# Patient Record
Sex: FEMALE | Race: WHITE | NOT HISPANIC OR LATINO | Employment: FULL TIME
[De-identification: names, ages, dates, MRNs, and addresses within clinical notes are randomized per-mention and may not be internally consistent; named-entity substitution may affect disease eponyms.]

---

## 2020-01-17 ENCOUNTER — RECORDS - HEALTHEAST (OUTPATIENT)
Dept: ADMINISTRATIVE | Facility: OTHER | Age: 60
End: 2020-01-17

## 2020-01-17 LAB — ALBUMIN (URINE) MG/SPEC: 0.8 MG/DL

## 2020-01-22 ENCOUNTER — RECORDS - HEALTHEAST (OUTPATIENT)
Dept: ADMINISTRATIVE | Facility: OTHER | Age: 60
End: 2020-01-22

## 2020-01-30 ENCOUNTER — RECORDS - HEALTHEAST (OUTPATIENT)
Dept: ADMINISTRATIVE | Facility: OTHER | Age: 60
End: 2020-01-30

## 2020-03-04 ENCOUNTER — OFFICE VISIT - HEALTHEAST (OUTPATIENT)
Dept: FAMILY MEDICINE | Facility: CLINIC | Age: 60
End: 2020-03-04

## 2020-03-04 ENCOUNTER — RECORDS - HEALTHEAST (OUTPATIENT)
Dept: ADMINISTRATIVE | Facility: OTHER | Age: 60
End: 2020-03-04

## 2020-03-04 DIAGNOSIS — Z12.31 VISIT FOR SCREENING MAMMOGRAM: ICD-10-CM

## 2020-03-04 DIAGNOSIS — Z23 IMMUNIZATION DUE: ICD-10-CM

## 2020-03-04 DIAGNOSIS — M25.562 CHRONIC PAIN OF LEFT KNEE: ICD-10-CM

## 2020-03-04 DIAGNOSIS — Z76.89 ESTABLISHING CARE WITH NEW DOCTOR, ENCOUNTER FOR: ICD-10-CM

## 2020-03-04 DIAGNOSIS — Z12.11 SCREEN FOR COLON CANCER: ICD-10-CM

## 2020-03-04 DIAGNOSIS — M23.52 CHRONIC INSTABILITY OF KNEE, LEFT KNEE: ICD-10-CM

## 2020-03-04 DIAGNOSIS — G89.29 CHRONIC PAIN OF LEFT KNEE: ICD-10-CM

## 2020-03-04 ASSESSMENT — MIFFLIN-ST. JEOR: SCORE: 1470.94

## 2020-03-09 ENCOUNTER — RECORDS - HEALTHEAST (OUTPATIENT)
Dept: HEALTH INFORMATION MANAGEMENT | Facility: CLINIC | Age: 60
End: 2020-03-09

## 2020-03-17 ENCOUNTER — COMMUNICATION - HEALTHEAST (OUTPATIENT)
Dept: FAMILY MEDICINE | Facility: CLINIC | Age: 60
End: 2020-03-17

## 2020-03-23 ENCOUNTER — COMMUNICATION - HEALTHEAST (OUTPATIENT)
Dept: FAMILY MEDICINE | Facility: CLINIC | Age: 60
End: 2020-03-23

## 2020-06-09 ENCOUNTER — HOSPITAL ENCOUNTER (OUTPATIENT)
Dept: MRI IMAGING | Facility: HOSPITAL | Age: 60
Discharge: HOME | End: 2020-06-09
Attending: FAMILY MEDICINE

## 2020-06-09 DIAGNOSIS — G89.29 CHRONIC PAIN OF LEFT KNEE: ICD-10-CM

## 2020-06-09 DIAGNOSIS — M25.562 CHRONIC PAIN OF LEFT KNEE: ICD-10-CM

## 2020-06-09 DIAGNOSIS — M23.52 CHRONIC INSTABILITY OF KNEE, LEFT KNEE: ICD-10-CM

## 2020-06-16 ENCOUNTER — OFFICE VISIT - HEALTHEAST (OUTPATIENT)
Dept: FAMILY MEDICINE | Facility: CLINIC | Age: 60
End: 2020-06-16

## 2020-06-16 DIAGNOSIS — L29.9 ITCHING: ICD-10-CM

## 2020-06-16 DIAGNOSIS — M25.562 CHRONIC PAIN OF LEFT KNEE: ICD-10-CM

## 2020-06-16 DIAGNOSIS — R20.0 NUMBNESS OF RIGHT ANTERIOR THIGH: ICD-10-CM

## 2020-06-16 DIAGNOSIS — G89.29 CHRONIC PAIN OF LEFT KNEE: ICD-10-CM

## 2020-06-19 ENCOUNTER — OFFICE VISIT - HEALTHEAST (OUTPATIENT)
Dept: FAMILY MEDICINE | Facility: CLINIC | Age: 60
End: 2020-06-19

## 2020-06-19 DIAGNOSIS — M79.651 PAIN OF RIGHT THIGH: ICD-10-CM

## 2020-06-19 DIAGNOSIS — G45.4 TGA (TRANSIENT GLOBAL AMNESIA): ICD-10-CM

## 2020-06-19 DIAGNOSIS — M33.13 DERMATOMYOSITIS (H): ICD-10-CM

## 2020-06-24 ENCOUNTER — OFFICE VISIT (OUTPATIENT)
Dept: NEUROLOGY | Facility: CLINIC | Age: 60
End: 2020-06-24
Payer: COMMERCIAL

## 2020-06-24 VITALS — WEIGHT: 192 LBS | HEIGHT: 67 IN | BODY MASS INDEX: 30.13 KG/M2

## 2020-06-24 DIAGNOSIS — R20.0 NUMBNESS: Primary | ICD-10-CM

## 2020-06-24 PROCEDURE — 99207 ZZC NO CHARGE LOS: CPT | Mod: 25 | Performed by: PSYCHIATRY & NEUROLOGY

## 2020-06-24 RX ORDER — LISINOPRIL/HYDROCHLOROTHIAZIDE 10-12.5 MG
TABLET ORAL EVERY 24 HOURS
COMMUNITY

## 2020-06-24 RX ORDER — HYDROXYCHLOROQUINE SULFATE 200 MG/1
TABLET, FILM COATED ORAL
COMMUNITY
Start: 2020-06-09

## 2020-06-24 RX ORDER — ASPIRIN 325 MG
325 TABLET ORAL
COMMUNITY

## 2020-06-24 RX ORDER — OMEGA-3 FATTY ACIDS/FISH OIL 300-1000MG
2 CAPSULE ORAL
COMMUNITY

## 2020-06-24 ASSESSMENT — MIFFLIN-ST. JEOR: SCORE: 1488.54

## 2020-06-24 NOTE — PROGRESS NOTES
"NEUROLOGY OUTPATIENT CONSULT NOTE (VIDEO)  Jun 24, 2020     CHIEF COMPLAINT/REASON FOR VISIT/REASON FOR CONSULT  Patient presents with:  Transient global amnesia    REASON FOR CONSULTATION- TGA    REFERRAL SOURCE  Dr. Hawa Stock  CC Dr. Hawa Stock    Video Visit Consent  Patient is being evaluated via a billable video visit. The patient has been notified of following:   \"This video visit will be conducted via a call between you and your physician/provider. We have found that certain health care needs can be provided without the need for an in-person physical exam. This service lets us provide the care you need with a video conversation. If a prescription is necessary we can send it directly to your pharmacy. If lab work is needed we can place an order for that and you can then stop by our lab to have the test done at a later time.  If during the course of the call the physician/provider feels a video visit is not appropriate, you will not be charged for this service.  Physician has received verbal consent for a Video Visit from the patient? YES  Patient would like the video invitation sent by: Email/SMS    Video Visit Details  Type of service: Video Visit  Video Start Time: 12:42  Video End Time (time video stopped): ***  Originating Location (pt. Location): Patient's Home  Distant Location (provider location): [facility] [place]   Mode of Communication: Video Conference via [employer]        HISTORY OF PRESENT ILLNESS  Kaylee Pabon is a 59 year old female who comes to the Neurology clinic for evaluation of  ***    Previous history is reviewed and this is unchanged.    PAST MEDICAL/SURGICAL HISTORY  Past Medical History:   Diagnosis Date     Dementia (H)      Hypertension      There is no problem list on file for this patient.      FAMILY HISTORY  Family History   Problem Relation Age of Onset     Diabetes Father      Chiari Malformation Paternal Grandmother      Cancer Paternal Uncle  " "      SOCIAL HISTORY  Social History     Tobacco Use     Smoking status: Never Smoker     Smokeless tobacco: Never Used   Substance Use Topics     Alcohol use: Not Currently     Drug use: Never       SYSTEMS REVIEW  Twelve-system ROS was done and other than the HPI this was negative.    MEDICATIONS  diclofenac (VOLTAREN) 1 % topical gel,   hydroxychloroquine (PLAQUENIL) 200 MG tablet,   aspirin (ASA) 325 MG tablet, Take 325 mg by mouth  lisinopril-hydrochlorothiazide (ZESTORETIC) 10-12.5 MG tablet, every 24 hours  omega 3 1000 MG CAPS, Take 2 g by mouth    No current facility-administered medications on file prior to visit.        PHYSICAL EXAMINATION  VITALS: Ht 1.702 m (5' 7\")   Wt 87.1 kg (192 lb)   BMI 30.07 kg/m    PHYSICAL EXAM  Exam was limited due to video encounter.    Vitals-Unable to do on video  GENERAL -Health appearing, No apparent distress  EYES- No scleral icterus, no eyelid droop, Pupils symmetric  HEENT - Normocephalic, atraumatic, Hearing grossly intact; Oral mucosa moist and pink in color. External Ears and nose intact.   Neck - soft/flexible with normal ROM on visual inspection.  PULM - Good spontaneous respiratory effort;  CV- No edema on visual inspection  MSK- Gait - see Neuro section; Strength and tone- see Neuro section; Range of motion grossly intact.  Psych- Normal mood and affect. Good judgment and insight.     Neurological  Mental status - Patient is awake and oriented. Attention span is normal. Language is fluent and follows commands appropriately.   Cranial nerves - Pupils are symmetric; EOMI, NLF symmetric  Motor - There is no pronator drift. Antigravity in all 4 ext.  Tone - No evidence of rigidity on visual inspection. No tremor.  Reflexes - Unable to do on video  Sensation - Unable to do on Video  Coordination - Finger to nose without dysmetria.   Gait and station - Romberg is negative. Gait is steady        DIAGNOSTICS  MRI    EMG    EEG    CARDIOLOGY    RELEVANT " LABS    OUTSIDE RECORDS  Outside referral notes were reviewed and pertinent information has been summarized in the HPI.    IMPRESSION/REPORT/PLAN  There are no diagnoses linked to this encounter.    There are no discontinued medications.    This is a 59 year old female        Sesar Anthony MD  Neurologist  [facility]  Tel:- [phone]    This note was dictated using voice recognition software.  Any grammatical or context distortions are unintentional and inherent to the software.

## 2020-06-24 NOTE — Clinical Note
6/24/2020         RE: Kaylee Pabon  [address] [address]        Dear Colleague,    Thank you for referring your patient, Kaylee Pabon, to the [facility] NEUROLOGY [place]. Please see a copy of my visit note below.    No notes on file    Again, thank you for allowing me to participate in the care of your patient.        Sincerely,        Sesar Anthony MD

## 2020-06-24 NOTE — NURSING NOTE
Chief Complaint   Patient presents with     Transient global amnesia     Esperanza Lul on 6/24/2020 at 11:23 AM

## 2020-06-29 ENCOUNTER — TRANSFERRED RECORDS (OUTPATIENT)
Dept: HEALTH INFORMATION MANAGEMENT | Facility: CLINIC | Age: 60
End: 2020-06-29

## 2020-06-29 ENCOUNTER — RECORDS - HEALTHEAST (OUTPATIENT)
Dept: ADMINISTRATIVE | Facility: OTHER | Age: 60
End: 2020-06-29

## 2020-06-29 ENCOUNTER — OFFICE VISIT (OUTPATIENT)
Dept: NEUROLOGY | Facility: CLINIC | Age: 60
End: 2020-06-29
Payer: COMMERCIAL

## 2020-06-29 VITALS
HEART RATE: 86 BPM | WEIGHT: 192 LBS | HEIGHT: 67 IN | SYSTOLIC BLOOD PRESSURE: 131 MMHG | DIASTOLIC BLOOD PRESSURE: 81 MMHG | BODY MASS INDEX: 30.13 KG/M2

## 2020-06-29 DIAGNOSIS — G57.11 MERALGIA PARAESTHETICA, RIGHT: Primary | ICD-10-CM

## 2020-06-29 PROCEDURE — 99204 OFFICE O/P NEW MOD 45 MIN: CPT | Mod: 95 | Performed by: PSYCHIATRY & NEUROLOGY

## 2020-06-29 ASSESSMENT — MIFFLIN-ST. JEOR: SCORE: 1488.54

## 2020-06-29 NOTE — PROGRESS NOTES
NEUROLOGY OUTPATIENT CONSULT NOTE  Jun 29, 2020     CHIEF COMPLAINT/REASON FOR VISIT/REASON FOR CONSULT  Patient presents with:  Spasms: LLE itchiness and spasms   Transient global amnesia    REASON FOR CONSULTATION-right leg numbness and itching    REFERRAL SOURCE  Dr. Hawa Stock   Dr. Hawa Stock    HISTORY OF PRESENT ILLNESS  Kaylee Pabon is a 59 year old female who comes to the Neurology clinic for evaluation of right leg numbness and itching.  Patient reports the symptoms came on about 6 weeks ago.  Denies any triggering factors.  Denies any weight gain or wearing any tight clothes or tight belts.  Symptoms are limited to the lateral side of the right thigh.  Is more numbness pain and itching.  Symptoms have improved since onset.  Nothing makes symptoms better or worse.  They are more prominent in the evening time.  She does have some back pain on the left side.  Denies any shooting pain down the legs.  Denies any balance issues or weakness in the leg.  She does have a recent diagnosis of dermatomyositis and is on hydrochloric when.  The pain sometimes wakes the patient up and she has spasms and cramps in her leg.  She has not tried any medications for this.  Denies any other associated symptoms.    Significant for transient global amnesia.  This was 4 years ago in [place].  Symptoms lasted for hours.  Patient had no recollection of this event.  Testing was all negative.  She has not had any recurrence of the symptoms or any other associated symptoms.    Previous history is reviewed and this is unchanged.    PAST MEDICAL/SURGICAL HISTORY  Significant for migraines, arthritis, high blood pressure    FAMILY HISTORY  Family History   Problem Relation Age of Onset     Diabetes Father      Chiari Malformation Paternal Grandmother      Cancer Paternal Uncle    negative for neurological conditions    SOCIAL HISTORY  Social History     Tobacco Use     Smoking status: Never Smoker     Smokeless tobacco: Never Used  "  Substance Use Topics     Alcohol use: Not Currently     Drug use: Never       SYSTEMS REVIEW  Twelve-system ROS was done and other than the HPI this was negative except for back pain, arm and leg pain, joint pain, numbness and tingling, dizziness, sleepiness during the day, sleeping problems, headaches, anxiety, palpitations, bloating, bowel problems, skin changes    MEDICATIONS  aspirin (ASA) 325 MG tablet, Take 325 mg by mouth  hydroxychloroquine (PLAQUENIL) 200 MG tablet,   lisinopril-hydrochlorothiazide (ZESTORETIC) 10-12.5 MG tablet, every 24 hours  omega 3 1000 MG CAPS, Take 2 g by mouth  diclofenac (VOLTAREN) 1 % topical gel,     No current facility-administered medications on file prior to visit.        PHYSICAL EXAMINATION  VITALS: /81 (BP Location: Left arm)   Pulse 86   Ht 1.702 m (5' 7\")   Wt 87.1 kg (192 lb)   BMI 30.07 kg/m    GENERAL: Healthy appearing, alert, no acute distress, normal habitus.  CARDIOVASCULAR: Extremities warm and well perfused. Pulses present.   EYES: Funduscopic exam does not reveal any papilledema.   NEUROLOGICAL:  Patient is awake and oriented to self, place and time.  Attention span is normal.  Memory is grossly intact.  Language is fluent and follows commands appropriately.  Appropriate fund of knowledge. Cranial nerves 2-12 are intact. There is no pronator drift.  Motor exam shows 5/5 strength in all extremities.  Tone is symmetric bilaterally in upper and lower extremities.  Reflexes are symmetric and 2+ in upper extremities and lower extremities. Sensory exam is grossly intact to light touch, pin prick and vibration with decreased pinprick in the right lower extremity in the lateral thigh region.  Finger to nose and heel to shin is without dysmetria.  Romberg is negative.  Gait is normal and the patient is able to do tandem walk and walk on toes and heels.      DIAGNOSTICS    RELEVANT LABS  DOUGLAS testing done in the past was positive for double-stranded DNA " antibodies.    OUTSIDE RECORDS  Outside referral notes were reviewed and pertinent information has been summarized in the HPI.    IMPRESSION/REPORT/PLAN  Meralgia paraesthetica, right    This is a 59 year old female with right-sided lateral thigh numbness and pain and paresthesias.  Patient symptoms are clinically suggestive of meralgia paresthetica.  Discussed prognosis of this with the patient.  We will set her up with physical therapy.  Discussed gabapentin which she wants to hold off on.  We will get an EMG check blood work to rule out other causes for her symptoms.  Overall symptoms are currently improving.  I can see her back in 6 weeks to see what the testing shows and to see if medication needs to be tried at that point.  I do not think the transient global amnesia is related to the meralgia paresthetica.  If she does have new neurological symptoms she should call the neurology clinic.    -     EMG  -     Vitamin B12  -     Vitamin B1 whole blood  -     TSH with free T4 reflex  -     Protein electrophoresis  -     Methylmalonic Acid  -     Lyme Disease Humaira with reflex to WB Serum  -     Ferritin  -     Glucose  -     CK total  -     PHYSICAL THERAPY REFERRAL    Sesar Anthony MD  Neurologist  [facility]  Tel:- [phone]    This note was dictated using voice recognition software.  Any grammatical or context distortions are unintentional and inherent to the software.

## 2020-06-29 NOTE — LETTER
6/29/2020         RE: Kaylee Pabno  [address] [address]        Dear Colleague,    Thank you for referring your patient, Kaylee Pabon, to the [facility] NEUROLOGY [place]. Please see a copy of my visit note below.    NEUROLOGY OUTPATIENT CONSULT NOTE  Jun 29, 2020     CHIEF COMPLAINT/REASON FOR VISIT/REASON FOR CONSULT  Patient presents with:  Spasms: LLE itchiness and spasms   Transient global amnesia    REASON FOR CONSULTATION-right leg numbness and itching    REFERRAL SOURCE  Dr. Hawa Stock  CC Dr. Hawa Stock    HISTORY OF PRESENT ILLNESS  Kaylee Pabon is a 59 year old female who comes to the Neurology clinic for evaluation of right leg numbness and itching.  Patient reports the symptoms came on about 6 weeks ago.  Denies any triggering factors.  Denies any weight gain or wearing any tight clothes or tight belts.  Symptoms are limited to the lateral side of the right thigh.  Is more numbness pain and itching.  Symptoms have improved since onset.  Nothing makes symptoms better or worse.  They are more prominent in the evening time.  She does have some back pain on the left side.  Denies any shooting pain down the legs.  Denies any balance issues or weakness in the leg.  She does have a recent diagnosis of dermatomyositis and is on hydrochloric when.  The pain sometimes wakes the patient up and she has spasms and cramps in her leg.  She has not tried any medications for this.  Denies any other associated symptoms.    Significant for transient global amnesia.  This was 4 years ago in [place].  Symptoms lasted for hours.  Patient had no recollection of this event.  Testing was all negative.  She has not had any recurrence of the symptoms or any other associated symptoms.    Previous history is reviewed and this is unchanged.    PAST MEDICAL/SURGICAL HISTORY  Significant for migraines, arthritis, high blood pressure    FAMILY HISTORY  Family History   Problem Relation Age  "of Onset     Diabetes Father      Chiari Malformation Paternal Grandmother      Cancer Paternal Uncle    negative for neurological conditions    SOCIAL HISTORY  Social History     Tobacco Use     Smoking status: Never Smoker     Smokeless tobacco: Never Used   Substance Use Topics     Alcohol use: Not Currently     Drug use: Never       SYSTEMS REVIEW  Twelve-system ROS was done and other than the HPI this was negative except for back pain, arm and leg pain, joint pain, numbness and tingling, dizziness, sleepiness during the day, sleeping problems, headaches, anxiety, palpitations, bloating, bowel problems, skin changes    MEDICATIONS  aspirin (ASA) 325 MG tablet, Take 325 mg by mouth  hydroxychloroquine (PLAQUENIL) 200 MG tablet,   lisinopril-hydrochlorothiazide (ZESTORETIC) 10-12.5 MG tablet, every 24 hours  omega 3 1000 MG CAPS, Take 2 g by mouth  diclofenac (VOLTAREN) 1 % topical gel,     No current facility-administered medications on file prior to visit.        PHYSICAL EXAMINATION  VITALS: /81 (BP Location: Left arm)   Pulse 86   Ht 1.702 m (5' 7\")   Wt 87.1 kg (192 lb)   BMI 30.07 kg/m    GENERAL: Healthy appearing, alert, no acute distress, normal habitus.  CARDIOVASCULAR: Extremities warm and well perfused. Pulses present.   EYES: Funduscopic exam does not reveal any papilledema.   NEUROLOGICAL:  Patient is awake and oriented to self, place and time.  Attention span is normal.  Memory is grossly intact.  Language is fluent and follows commands appropriately.  Appropriate fund of knowledge. Cranial nerves 2-12 are intact. There is no pronator drift.  Motor exam shows 5/5 strength in all extremities.  Tone is symmetric bilaterally in upper and lower extremities.  Reflexes are symmetric and 2+ in upper extremities and lower extremities. Sensory exam is grossly intact to light touch, pin prick and vibration with decreased pinprick in the right lower extremity in the lateral thigh region.  Finger to " nose and heel to shin is without dysmetria.  Romberg is negative.  Gait is normal and the patient is able to do tandem walk and walk on toes and heels.      DIAGNOSTICS    RELEVANT LABS  DOUGLAS testing done in the past was positive for double-stranded DNA antibodies.    OUTSIDE RECORDS  Outside referral notes were reviewed and pertinent information has been summarized in the HPI.    IMPRESSION/REPORT/PLAN  Meralgia paraesthetica, right    This is a 59 year old female with right-sided lateral thigh numbness and pain and paresthesias.  Patient symptoms are clinically suggestive of meralgia paresthetica.  Discussed prognosis of this with the patient.  We will set her up with physical therapy.  Discussed gabapentin which she wants to hold off on.  We will get an EMG check blood work to rule out other causes for her symptoms.  Overall symptoms are currently improving.  I can see her back in 6 weeks to see what the testing shows and to see if medication needs to be tried at that point.  I do not think the transient global amnesia is related to the meralgia paresthetica.  If she does have new neurological symptoms she should call the neurology clinic.    -     EMG  -     Vitamin B12  -     Vitamin B1 whole blood  -     TSH with free T4 reflex  -     Protein electrophoresis  -     Methylmalonic Acid  -     Lyme Disease Humaira with reflex to WB Serum  -     Ferritin  -     Glucose  -     CK total  -     PHYSICAL THERAPY REFERRAL    Sesar Anthony MD  Neurologist  [facility] Neurology [facility]  Tel:- [phone]    This note was dictated using voice recognition software.  Any grammatical or context distortions are unintentional and inherent to the software.      Again, thank you for allowing me to participate in the care of your patient.        Sincerely,        Sesar Anthony MD

## 2020-06-29 NOTE — NURSING NOTE
Chief Complaint   Patient presents with     Spasms     LLE itchiness and spasms      Zulma Markham CMA on 6/29/2020 at 8:26 AM

## 2020-10-06 ENCOUNTER — AMBULATORY - HEALTHEAST (OUTPATIENT)
Dept: FAMILY MEDICINE | Facility: CLINIC | Age: 60
End: 2020-10-06

## 2020-10-06 DIAGNOSIS — Z12.31 VISIT FOR SCREENING MAMMOGRAM: ICD-10-CM

## 2021-01-04 ENCOUNTER — HEALTH MAINTENANCE LETTER (OUTPATIENT)
Age: 61
End: 2021-01-04

## 2021-06-02 ENCOUNTER — AMBULATORY - HEALTHEAST (OUTPATIENT)
Dept: SURGERY | Facility: HOSPITAL | Age: 61
End: 2021-06-02

## 2021-06-02 DIAGNOSIS — Z11.59 ENCOUNTER FOR SCREENING FOR OTHER VIRAL DISEASES: ICD-10-CM

## 2021-06-03 ENCOUNTER — RECORDS - HEALTHEAST (OUTPATIENT)
Dept: ADMINISTRATIVE | Facility: OTHER | Age: 61
End: 2021-06-03

## 2021-06-06 NOTE — TELEPHONE ENCOUNTER
----- Message from Hawa Stock DO sent at 3/16/2020  9:58 PM CDT -----  Please call patient and inform:  Sorry for my delay in results. Your xray showed mild arthritis behind your knee cap and a little swelling in the joint. I don't suspect this explains your instability though. As discussed I think we should get an MRI however given the current climate this can definitely wait 6 weeks to do.

## 2021-06-06 NOTE — TELEPHONE ENCOUNTER
Called and spoke with Kaylee Pabon, Message was given, Kaylee Pabno understood, no further questions.    I do not see MRI ordered. Will MRI be ordered?
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
## 2021-06-07 NOTE — TELEPHONE ENCOUNTER
----- Message from Hawa Stock DO sent at 3/22/2020 12:54 PM CDT -----  Please call patient and let her know I have placed an order for her to have MRI of her left knee as this appeared to be the worst knee per patient. Can have this done as discussed in 6 weeks or so.

## 2021-06-07 NOTE — TELEPHONE ENCOUNTER
Called and spoke with Kaylee Pabon , Message was given, Kaylee Pabon  understood, no further questions.

## 2021-06-08 NOTE — PROGRESS NOTES
"Kaylee Pabon is a 59 y.o. female who is being evaluated via a billable telephone visit.      The patient has been notified of following:     \"This telephone visit will be conducted via a call between you and your physician/provider. We have found that certain health care needs can be provided without the need for a physical exam.  This service lets us provide the care you need with a short phone conversation.  If a prescription is necessary we can send it directly to your pharmacy.  If lab work is needed we can place an order for that and you can then stop by our lab to have the test done at a later time.    Telephone visits are billed at different rates depending on your insurance coverage. During this emergency period, for some insurers they may be billed the same as an in-person visit.  Please reach out to your insurance provider with any questions.    If during the course of the call the physician/provider feels a telephone visit is not appropriate, you will not be charged for this service.\"    Patient has given verbal consent to a Telephone visit? Yes    What phone number would you like to be contacted at? [phone]    Patient would like to receive their AVS by AVS Preference: Mail a copy.    Additional provider notes:   Patient scheduled telephone visit to review recent MRI results of left knee.    IMPRESSION:   1.  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 Additional cartilage thinning within the central to lateral aspect of the femoral trochlear sulcus.  2.  No evidence for meniscal or ligamentous tearing.  3.  No evidence for effusion or loose body.  4.  No evidence for fracture.  5.  Mild quadriceps and patellar tendinopathy without tearing.    She has another concern she would like to discuss:  Numbness and itching.   Right thigh  Diagnosed with dermatomyositis.  Rash and blood work make the diagnosis. We think it is dermatomyositis. without the muscle involvement.   Right thigh " sometimes hurts. Was pretty sure it wasn't  Muscle and bone but from the dermatomyositis.   Started itching something terrible.   No rash.   2-3 times a week wakes her up during the night with severe pain and spasm. initially thought latoya mulligan. Stood up and took about 1 minute to calm down.   It will be numb for awhile.   More numb than the left side.   Able to get back to sleep  During the day- sometimes itchy and sometimes a little numb.     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 All of a sudden I didn't know what I was dong. Knew who I was or where I was but didn't know much of anything else.   Called her  and apparently she also called her landline which she did not remember  Couldn't remember anything from one moment to the next.   couldn't remember thing   Gave her mix of  migriane medicine and something else  Xray and CT scan.   Kept her overnight  Was really exhausted for a couple days.   Told her there wouldn't be a long term affect to her.  Was looking up her recent symptoms online and now is concerned about MS.  She is wondering what she should do if she should look into this more.  Would like to come see me in clinic    Assessment/Plan:  1. Chronic pain of left knee  Reviewed MRI results above.  No ligament or meniscal injury like we were concerned about.  Wanted to rule that out.  Grade 3 cartilage loss behind the kneecap.  Likely had this on her right side which she therefore had that surgery for 10 years ago.  Could consider Ortho referral to discuss possible surgery option, steroid injection or PT.  Patient would like to just wait at this time.  Just wanted to make sure there was not something worse going on that she should not be on her knee.  But she will just let me know when she is ready to make a decision about treatment.    2. Numbness of right anterior thigh  3. Itching: Very strange symptoms of this itching and numbness of her right thigh. Also i was not aware  of her history of transient global amnesia.  Asked her for some time to review this.  She would also like to be seen in clinic.  Will make appointment for Friday for her to come and see me.  We will see if the possibility for transient global amnesia possibly lead to chronic neurological issues.  Consider MS versus her dermatomyositis versus other less worrisome etiology.  She states understanding is agreeable to this plan.  We will see her Friday.        Phone call duration:  26 minutes    Hawa Stock, DO
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
## 2021-06-16 PROBLEM — E78.5 HYPERLIPIDEMIA: Status: ACTIVE | Noted: 2018-05-23

## 2021-06-16 PROBLEM — M18.0 PRIMARY OSTEOARTHRITIS OF BOTH FIRST CARPOMETACARPAL JOINTS: Status: ACTIVE | Noted: 2020-02-24

## 2021-06-16 PROBLEM — L80 PRIMARY VITILIGO: Status: ACTIVE | Noted: 2020-02-24

## 2021-06-16 PROBLEM — I10 HYPERTENSIVE DISORDER: Status: ACTIVE | Noted: 2018-05-23

## 2021-06-16 PROBLEM — K90.0 CELIAC DISEASE: Status: ACTIVE | Noted: 2020-02-24

## 2021-06-18 ENCOUNTER — RECORDS - HEALTHEAST (OUTPATIENT)
Dept: ADMINISTRATIVE | Facility: OTHER | Age: 61
End: 2021-06-18

## 2021-06-21 ENCOUNTER — AMBULATORY - HEALTHEAST (OUTPATIENT)
Dept: LAB | Facility: CLINIC | Age: 61
End: 2021-06-21

## 2021-06-21 DIAGNOSIS — Z11.59 ENCOUNTER FOR SCREENING FOR OTHER VIRAL DISEASES: ICD-10-CM

## 2021-06-22 ENCOUNTER — RECORDS - HEALTHEAST (OUTPATIENT)
Dept: ADMINISTRATIVE | Facility: OTHER | Age: 61
End: 2021-06-22

## 2021-06-22 ENCOUNTER — COMMUNICATION - HEALTHEAST (OUTPATIENT)
Dept: SCHEDULING | Facility: CLINIC | Age: 61
End: 2021-06-22

## 2021-06-22 ENCOUNTER — SURGERY - HEALTHEAST (OUTPATIENT)
Dept: SURGERY | Facility: HOSPITAL | Age: 61
End: 2021-06-22
Payer: COMMERCIAL

## 2021-06-22 LAB
SARS-COV-2 PCR COMMENT: NORMAL
SARS-COV-2 RNA SPEC QL NAA+PROBE: NEGATIVE
SARS-COV-2 VIRUS SPECIMEN SOURCE: NORMAL

## 2021-06-22 ASSESSMENT — MIFFLIN-ST. JEOR: SCORE: 1428.64

## 2021-07-06 VITALS — HEIGHT: 67 IN | BODY MASS INDEX: 28.35 KG/M2 | BODY MASS INDEX: 28.35 KG/M2 | WEIGHT: 181 LBS

## 2021-08-15 ENCOUNTER — HEALTH MAINTENANCE LETTER (OUTPATIENT)
Age: 61
End: 2021-08-15

## 2021-10-10 ENCOUNTER — HEALTH MAINTENANCE LETTER (OUTPATIENT)
Age: 61
End: 2021-10-10

## 2022-01-12 VITALS — HEIGHT: 67 IN | BODY MASS INDEX: 28.41 KG/M2 | WEIGHT: 181 LBS

## 2022-01-18 VITALS
TEMPERATURE: 98.6 F | RESPIRATION RATE: 16 BRPM | SYSTOLIC BLOOD PRESSURE: 125 MMHG | BODY MASS INDEX: 30.44 KG/M2 | HEART RATE: 85 BPM | WEIGHT: 192.9 LBS | DIASTOLIC BLOOD PRESSURE: 80 MMHG

## 2022-01-18 VITALS
TEMPERATURE: 98.1 F | DIASTOLIC BLOOD PRESSURE: 70 MMHG | SYSTOLIC BLOOD PRESSURE: 130 MMHG | WEIGHT: 191.2 LBS | RESPIRATION RATE: 16 BRPM | HEART RATE: 84 BPM | HEIGHT: 67 IN | BODY MASS INDEX: 30.01 KG/M2

## 2022-05-22 ENCOUNTER — HEALTH MAINTENANCE LETTER (OUTPATIENT)
Age: 62
End: 2022-05-22

## 2022-09-24 ENCOUNTER — HEALTH MAINTENANCE LETTER (OUTPATIENT)
Age: 62
End: 2022-09-24

## 2023-10-08 ENCOUNTER — HEALTH MAINTENANCE LETTER (OUTPATIENT)
Age: 63
End: 2023-10-08